# Patient Record
Sex: FEMALE | Race: WHITE | NOT HISPANIC OR LATINO | Employment: UNEMPLOYED | URBAN - NONMETROPOLITAN AREA
[De-identification: names, ages, dates, MRNs, and addresses within clinical notes are randomized per-mention and may not be internally consistent; named-entity substitution may affect disease eponyms.]

---

## 2018-08-28 ENCOUNTER — OFFICE VISIT (OUTPATIENT)
Dept: CARDIOLOGY | Facility: CLINIC | Age: 23
End: 2018-08-28

## 2018-08-28 VITALS
OXYGEN SATURATION: 99 % | SYSTOLIC BLOOD PRESSURE: 153 MMHG | HEIGHT: 63 IN | DIASTOLIC BLOOD PRESSURE: 43 MMHG | WEIGHT: 184 LBS | BODY MASS INDEX: 32.6 KG/M2

## 2018-08-28 DIAGNOSIS — I87.303 VENOUS HYPERTENSION OF BOTH LOWER EXTREMITIES: Primary | ICD-10-CM

## 2018-08-28 DIAGNOSIS — R60.0 EDEMA, LOWER EXTREMITY: ICD-10-CM

## 2018-08-28 PROCEDURE — 99204 OFFICE O/P NEW MOD 45 MIN: CPT | Performed by: INTERNAL MEDICINE

## 2018-08-28 RX ORDER — CETIRIZINE HYDROCHLORIDE 10 MG/1
TABLET ORAL
Refills: 3 | COMMUNITY
Start: 2018-08-21

## 2024-02-14 ENCOUNTER — OFFICE VISIT (OUTPATIENT)
Dept: SURGERY | Facility: CLINIC | Age: 29
End: 2024-02-14
Payer: MEDICAID

## 2024-02-14 ENCOUNTER — TELEPHONE (OUTPATIENT)
Dept: SURGERY | Facility: CLINIC | Age: 29
End: 2024-02-14

## 2024-02-14 VITALS
SYSTOLIC BLOOD PRESSURE: 123 MMHG | WEIGHT: 170.4 LBS | BODY MASS INDEX: 30.19 KG/M2 | DIASTOLIC BLOOD PRESSURE: 80 MMHG | HEART RATE: 68 BPM | HEIGHT: 63 IN

## 2024-02-14 DIAGNOSIS — K82.8 BILIARY DYSKINESIA: Primary | ICD-10-CM

## 2024-02-14 PROCEDURE — 1160F RVW MEDS BY RX/DR IN RCRD: CPT | Performed by: SURGERY

## 2024-02-14 PROCEDURE — 99204 OFFICE O/P NEW MOD 45 MIN: CPT | Performed by: SURGERY

## 2024-02-14 PROCEDURE — 1159F MED LIST DOCD IN RCRD: CPT | Performed by: SURGERY

## 2024-02-14 RX ORDER — SODIUM CHLORIDE 9 MG/ML
40 INJECTION, SOLUTION INTRAVENOUS AS NEEDED
OUTPATIENT
Start: 2024-02-14

## 2024-02-14 RX ORDER — METHYLPHENIDATE HYDROCHLORIDE 36 MG/1
36 TABLET ORAL EVERY MORNING
COMMUNITY

## 2024-02-14 RX ORDER — SODIUM CHLORIDE 0.9 % (FLUSH) 0.9 %
3 SYRINGE (ML) INJECTION EVERY 12 HOURS SCHEDULED
OUTPATIENT
Start: 2024-02-14

## 2024-02-14 RX ORDER — INDOCYANINE GREEN AND WATER 25 MG
2.5 KIT INJECTION ONCE
OUTPATIENT
Start: 2024-02-14 | End: 2024-02-14

## 2024-02-14 RX ORDER — SPIRONOLACTONE 25 MG/1
25 TABLET ORAL DAILY
COMMUNITY

## 2024-02-14 RX ORDER — SODIUM CHLORIDE 0.9 % (FLUSH) 0.9 %
10 SYRINGE (ML) INJECTION AS NEEDED
OUTPATIENT
Start: 2024-02-14

## 2024-02-14 NOTE — H&P (VIEW-ONLY)
"Date of Service: 2/14/2024    Subjective   Gurinder Garnett is a 28 y.o. female is being seen for consultation for biliary dyskinesia today at the request of Ivette Clemente MD    Chief complaint: Abdominal pain    Gurinder Garnett is a 28 y.o.  female, history of cerebral palsy, presents to my clinic with roughly 3 weeks of postprandial right upper quadrant abdominal pain that is episodic.  It tends to be worse with greasy or spicy foods.  There are some foods that she can eat and do not bother her.  She reports nausea without vomiting.  She has daily bowel movements    Past Medical History:   Diagnosis Date    CP (cerebral palsy)     Edema extremities        Past Surgical History:   Procedure Laterality Date    TONSILECTOMY, ADENOIDECTOMY, BILATERAL MYRINGOTOMY AND TUBES           Family History   Problem Relation Age of Onset    Skin cancer Mother     Hypertension Father     Diabetes Maternal Grandmother     Diabetes Maternal Grandfather     Thyroid cancer Paternal Grandmother          Social History     Socioeconomic History    Marital status: Single   Tobacco Use    Smoking status: Never     Passive exposure: Never    Smokeless tobacco: Never   Vaping Use    Vaping Use: Never used   Substance and Sexual Activity    Alcohol use: No    Drug use: No    Sexual activity: Defer                Review of Systems  14 pt Ros neg except for what is noted in HPI        Objective     Physical Exam:      02/14/24  1043   Weight: 77.3 kg (170 lb 6.4 oz)    Body mass index is 30.19 kg/m².  Constitution: /80   Pulse 68   Ht 160 cm (63\")   Wt 77.3 kg (170 lb 6.4 oz)   BMI 30.19 kg/m²  . No acute distress   Head: Normocephalic, atraumatic.   Eyes: Aligned without strabismus. Conjunctivae noninjected   Ears, Nose, Mouth: , no lesions appreciated   CV: Rhythm  and rate regular  Respiratory: Symmetric chest expansion. No respiratory distress.   Gastrointestinal:  Soft, ND, NT  Skin:  No cyanosis, clubbing or edema bilaterally  "   Lymphatics: No abnormal cervical or supraclavicular adenopathy  Neurologic: No gross deficits.  Alert and oriented x3  Psychiatric: Normal mood        Results:    Normal upper quadrant ultrasound    I personally reviewed the images from the patient's CT abdomen pelvis which is overall normal.    HIDA scan with 0% ejection fraction      Gurinder Garnett is a 28 y.o.  female with biliary dyskinesia    Risks and benefits of conservative versus operative management discussed with the patient and stepfather.  Risks and benefits of robotic assisted laparoscopic cholecystectomy discussed with the patient and she elects to proceed      BMI is >= 30 and <35. (Class 1 Obesity). The following options were offered after discussion;: information on healthy weight added to patient's after visit summary             Adan Norman MD  [facility]

## 2024-02-19 ENCOUNTER — TELEPHONE (OUTPATIENT)
Dept: SURGERY | Facility: CLINIC | Age: 29
End: 2024-02-19
Payer: MEDICAID

## 2024-02-19 NOTE — TELEPHONE ENCOUNTER
ATTEMPTED TO REACH PATIENT TO CONFIRM ARRIVAL TIME FOR SURGERY WITH DR. GLOVER 02/20/24 ARRIVAL AT 7:30 AM.  NO VOICE MAIL AVAILABLE.    PAT:  02/19/24 ARRIVE AT 8:45 AM, PATIENT HAS ARRIVED FOR THIS PROCESS.    NOTHING TO EAT OR DRINK AFTER MIDNIGHT ON MON 01/19/24  SX:  TUESDAY 02/20/24 ARRIVE AT 7:30 AM AT [facility], OUTPATIENT SURGERY DEPT.  PLEASE HAVE AN ADULT  WITH YOU TO DRIVE YOU HOME AFTER THE PROCEDURE.    IF YOU HAVE ANY QUESTIONS, PLEASE CALL OUR OFFICE [phone]    THANK YOU  OVI

## 2024-02-20 ENCOUNTER — ANESTHESIA (OUTPATIENT)
Dept: PERIOP | Facility: HOSPITAL | Age: 29
End: 2024-02-20
Payer: MEDICAID

## 2024-02-20 ENCOUNTER — HOSPITAL ENCOUNTER (OUTPATIENT)
Facility: HOSPITAL | Age: 29
Setting detail: HOSPITAL OUTPATIENT SURGERY
Discharge: HOME | End: 2024-02-20
Attending: SURGERY | Admitting: SURGERY
Payer: MEDICAID

## 2024-02-20 ENCOUNTER — APPOINTMENT (OUTPATIENT)
Dept: GENERAL RADIOLOGY | Facility: HOSPITAL | Age: 29
End: 2024-02-20
Payer: MEDICAID

## 2024-02-20 ENCOUNTER — ANESTHESIA EVENT (OUTPATIENT)
Dept: PERIOP | Facility: HOSPITAL | Age: 29
End: 2024-02-20
Payer: MEDICAID

## 2024-02-20 VITALS
HEIGHT: 62 IN | RESPIRATION RATE: 17 BRPM | TEMPERATURE: 98 F | OXYGEN SATURATION: 97 % | BODY MASS INDEX: 31.1 KG/M2 | HEART RATE: 66 BPM | SYSTOLIC BLOOD PRESSURE: 120 MMHG | WEIGHT: 169 LBS | DIASTOLIC BLOOD PRESSURE: 74 MMHG

## 2024-02-20 DIAGNOSIS — K82.8 BILIARY DYSKINESIA: Primary | ICD-10-CM

## 2024-02-20 LAB
B-HCG UR QL: NEGATIVE
EXPIRATION DATE: NORMAL
INTERNAL NEGATIVE CONTROL: NEGATIVE
INTERNAL POSITIVE CONTROL: POSITIVE
Lab: NORMAL

## 2024-02-20 PROCEDURE — 25010000002 KETOROLAC TROMETHAMINE PER 15 MG: Performed by: NURSE ANESTHETIST, CERTIFIED REGISTERED

## 2024-02-20 PROCEDURE — 25010000002 ROPIVACAINE PER 1 MG: Performed by: ANESTHESIOLOGY

## 2024-02-20 PROCEDURE — 25010000002 MEPERIDINE PER 100 MG: Performed by: NURSE ANESTHETIST, CERTIFIED REGISTERED

## 2024-02-20 PROCEDURE — 25810000003 SODIUM CHLORIDE PER 500 ML: Performed by: SURGERY

## 2024-02-20 PROCEDURE — 25010000002 BUPRENORPHINE PER 0.1 MG: Performed by: ANESTHESIOLOGY

## 2024-02-20 PROCEDURE — 25810000003 LACTATED RINGERS PER 1000 ML: Performed by: ANESTHESIOLOGY

## 2024-02-20 PROCEDURE — 81025 URINE PREGNANCY TEST: CPT | Performed by: ANESTHESIOLOGY

## 2024-02-20 PROCEDURE — 25010000002 FENTANYL CITRATE (PF) 50 MCG/ML SOLUTION: Performed by: NURSE ANESTHETIST, CERTIFIED REGISTERED

## 2024-02-20 PROCEDURE — 25010000002 INDOCYANINE GREEN 25 MG RECONSTITUTED SOLUTION: Performed by: SURGERY

## 2024-02-20 PROCEDURE — 25010000002 ONDANSETRON PER 1 MG: Performed by: NURSE ANESTHETIST, CERTIFIED REGISTERED

## 2024-02-20 PROCEDURE — 25010000002 DEXAMETHASONE PER 1 MG: Performed by: ANESTHESIOLOGY

## 2024-02-20 PROCEDURE — 25010000002 PROPOFOL 200 MG/20ML EMULSION: Performed by: NURSE ANESTHETIST, CERTIFIED REGISTERED

## 2024-02-20 PROCEDURE — 25010000002 NEOSTIGMINE 10 MG/10ML SOLUTION: Performed by: NURSE ANESTHETIST, CERTIFIED REGISTERED

## 2024-02-20 PROCEDURE — 25010000002 GLYCOPYRROLATE 0.4 MG/2ML SOLUTION: Performed by: NURSE ANESTHETIST, CERTIFIED REGISTERED

## 2024-02-20 PROCEDURE — 25010000002 CEFAZOLIN PER 500 MG: Performed by: SURGERY

## 2024-02-20 PROCEDURE — 25010000002 MIDAZOLAM PER 1 MG: Performed by: NURSE ANESTHETIST, CERTIFIED REGISTERED

## 2024-02-20 DEVICE — CLIP LIG HEMOLOK PA LG 6CT PRP: Type: IMPLANTABLE DEVICE | Site: ABDOMEN | Status: FUNCTIONAL

## 2024-02-20 DEVICE — SEAL HEMO SURG ARISTA/AH ABS/PWDR 3GM: Type: IMPLANTABLE DEVICE | Site: ABDOMEN | Status: FUNCTIONAL

## 2024-02-20 RX ORDER — SODIUM CHLORIDE 0.9 % (FLUSH) 0.9 %
3 SYRINGE (ML) INJECTION EVERY 12 HOURS SCHEDULED
Status: DISCONTINUED | OUTPATIENT
Start: 2024-02-20 | End: 2024-02-20 | Stop reason: HOSPADM

## 2024-02-20 RX ORDER — OXYCODONE HYDROCHLORIDE AND ACETAMINOPHEN 5; 325 MG/1; MG/1
1 TABLET ORAL ONCE AS NEEDED
Status: COMPLETED | OUTPATIENT
Start: 2024-02-20 | End: 2024-02-20

## 2024-02-20 RX ORDER — SODIUM CHLORIDE 0.9 % (FLUSH) 0.9 %
10 SYRINGE (ML) INJECTION AS NEEDED
Status: DISCONTINUED | OUTPATIENT
Start: 2024-02-20 | End: 2024-02-20 | Stop reason: HOSPADM

## 2024-02-20 RX ORDER — PROPOFOL 10 MG/ML
INJECTION, EMULSION INTRAVENOUS AS NEEDED
Status: DISCONTINUED | OUTPATIENT
Start: 2024-02-20 | End: 2024-02-20 | Stop reason: SURG

## 2024-02-20 RX ORDER — NEOSTIGMINE METHYLSULFATE 1 MG/ML
INJECTION, SOLUTION INTRAVENOUS AS NEEDED
Status: DISCONTINUED | OUTPATIENT
Start: 2024-02-20 | End: 2024-02-20 | Stop reason: SURG

## 2024-02-20 RX ORDER — ONDANSETRON 2 MG/ML
INJECTION INTRAMUSCULAR; INTRAVENOUS AS NEEDED
Status: DISCONTINUED | OUTPATIENT
Start: 2024-02-20 | End: 2024-02-20 | Stop reason: SURG

## 2024-02-20 RX ORDER — IBUPROFEN 600 MG/1
600 TABLET ORAL EVERY 6 HOURS PRN
Qty: 20 TABLET | Refills: 0 | Status: SHIPPED | OUTPATIENT
Start: 2024-02-20 | End: 2025-02-19

## 2024-02-20 RX ORDER — IPRATROPIUM BROMIDE AND ALBUTEROL SULFATE 2.5; .5 MG/3ML; MG/3ML
3 SOLUTION RESPIRATORY (INHALATION) ONCE AS NEEDED
Status: DISCONTINUED | OUTPATIENT
Start: 2024-02-20 | End: 2024-02-20 | Stop reason: HOSPADM

## 2024-02-20 RX ORDER — MEPERIDINE HYDROCHLORIDE 25 MG/ML
12.5 INJECTION INTRAMUSCULAR; INTRAVENOUS; SUBCUTANEOUS
Status: COMPLETED | OUTPATIENT
Start: 2024-02-20 | End: 2024-02-20

## 2024-02-20 RX ORDER — ROPIVACAINE HYDROCHLORIDE 5 MG/ML
INJECTION, SOLUTION EPIDURAL; INFILTRATION; PERINEURAL
Status: COMPLETED | OUTPATIENT
Start: 2024-02-20 | End: 2024-02-20

## 2024-02-20 RX ORDER — LIDOCAINE HYDROCHLORIDE 20 MG/ML
INJECTION, SOLUTION EPIDURAL; INFILTRATION; INTRACAUDAL; PERINEURAL AS NEEDED
Status: DISCONTINUED | OUTPATIENT
Start: 2024-02-20 | End: 2024-02-20 | Stop reason: SURG

## 2024-02-20 RX ORDER — SODIUM CHLORIDE 9 MG/ML
40 INJECTION, SOLUTION INTRAVENOUS AS NEEDED
Status: DISCONTINUED | OUTPATIENT
Start: 2024-02-20 | End: 2024-02-20 | Stop reason: HOSPADM

## 2024-02-20 RX ORDER — GLYCOPYRROLATE 0.2 MG/ML
INJECTION INTRAMUSCULAR; INTRAVENOUS AS NEEDED
Status: DISCONTINUED | OUTPATIENT
Start: 2024-02-20 | End: 2024-02-20 | Stop reason: SURG

## 2024-02-20 RX ORDER — ONDANSETRON 2 MG/ML
4 INJECTION INTRAMUSCULAR; INTRAVENOUS AS NEEDED
Status: DISCONTINUED | OUTPATIENT
Start: 2024-02-20 | End: 2024-02-20 | Stop reason: HOSPADM

## 2024-02-20 RX ORDER — MIDAZOLAM HYDROCHLORIDE 1 MG/ML
INJECTION INTRAMUSCULAR; INTRAVENOUS AS NEEDED
Status: DISCONTINUED | OUTPATIENT
Start: 2024-02-20 | End: 2024-02-20 | Stop reason: SURG

## 2024-02-20 RX ORDER — SODIUM CHLORIDE, SODIUM LACTATE, POTASSIUM CHLORIDE, CALCIUM CHLORIDE 600; 310; 30; 20 MG/100ML; MG/100ML; MG/100ML; MG/100ML
100 INJECTION, SOLUTION INTRAVENOUS ONCE AS NEEDED
Status: DISCONTINUED | OUTPATIENT
Start: 2024-02-20 | End: 2024-02-20 | Stop reason: HOSPADM

## 2024-02-20 RX ORDER — SODIUM CHLORIDE 9 MG/ML
INJECTION, SOLUTION INTRAVENOUS AS NEEDED
Status: DISCONTINUED | OUTPATIENT
Start: 2024-02-20 | End: 2024-02-20 | Stop reason: HOSPADM

## 2024-02-20 RX ORDER — INDOCYANINE GREEN AND WATER 25 MG
2.5 KIT INJECTION ONCE
Status: COMPLETED | OUTPATIENT
Start: 2024-02-20 | End: 2024-02-20

## 2024-02-20 RX ORDER — ROCURONIUM BROMIDE 10 MG/ML
INJECTION, SOLUTION INTRAVENOUS AS NEEDED
Status: DISCONTINUED | OUTPATIENT
Start: 2024-02-20 | End: 2024-02-20 | Stop reason: SURG

## 2024-02-20 RX ORDER — MIDAZOLAM HYDROCHLORIDE 1 MG/ML
1 INJECTION INTRAMUSCULAR; INTRAVENOUS
Status: DISCONTINUED | OUTPATIENT
Start: 2024-02-20 | End: 2024-02-20 | Stop reason: HOSPADM

## 2024-02-20 RX ORDER — SODIUM CHLORIDE 0.9 % (FLUSH) 0.9 %
10 SYRINGE (ML) INJECTION EVERY 12 HOURS SCHEDULED
Status: DISCONTINUED | OUTPATIENT
Start: 2024-02-20 | End: 2024-02-20 | Stop reason: HOSPADM

## 2024-02-20 RX ORDER — SODIUM CHLORIDE, SODIUM LACTATE, POTASSIUM CHLORIDE, CALCIUM CHLORIDE 600; 310; 30; 20 MG/100ML; MG/100ML; MG/100ML; MG/100ML
125 INJECTION, SOLUTION INTRAVENOUS ONCE
Status: COMPLETED | OUTPATIENT
Start: 2024-02-20 | End: 2024-02-20

## 2024-02-20 RX ORDER — DEXAMETHASONE SODIUM PHOSPHATE 4 MG/ML
INJECTION, SOLUTION INTRA-ARTICULAR; INTRALESIONAL; INTRAMUSCULAR; INTRAVENOUS; SOFT TISSUE
Status: COMPLETED | OUTPATIENT
Start: 2024-02-20 | End: 2024-02-20

## 2024-02-20 RX ORDER — MIDAZOLAM HYDROCHLORIDE 1 MG/ML
1 INJECTION INTRAMUSCULAR; INTRAVENOUS
Status: DISCONTINUED | OUTPATIENT
Start: 2024-02-20 | End: 2024-02-20 | Stop reason: SDUPTHER

## 2024-02-20 RX ORDER — KETOROLAC TROMETHAMINE 30 MG/ML
INJECTION, SOLUTION INTRAMUSCULAR; INTRAVENOUS AS NEEDED
Status: DISCONTINUED | OUTPATIENT
Start: 2024-02-20 | End: 2024-02-20 | Stop reason: SURG

## 2024-02-20 RX ORDER — FENTANYL CITRATE 50 UG/ML
50 INJECTION, SOLUTION INTRAMUSCULAR; INTRAVENOUS
Status: DISCONTINUED | OUTPATIENT
Start: 2024-02-20 | End: 2024-02-20 | Stop reason: HOSPADM

## 2024-02-20 RX ORDER — FAMOTIDINE 10 MG/ML
INJECTION, SOLUTION INTRAVENOUS AS NEEDED
Status: DISCONTINUED | OUTPATIENT
Start: 2024-02-20 | End: 2024-02-20 | Stop reason: SURG

## 2024-02-20 RX ORDER — TRAMADOL HYDROCHLORIDE 50 MG/1
50 TABLET ORAL EVERY 6 HOURS PRN
Qty: 12 TABLET | Refills: 0 | Status: SHIPPED | OUTPATIENT
Start: 2024-02-20 | End: 2025-02-19

## 2024-02-20 RX ORDER — FENTANYL CITRATE 50 UG/ML
INJECTION, SOLUTION INTRAMUSCULAR; INTRAVENOUS AS NEEDED
Status: DISCONTINUED | OUTPATIENT
Start: 2024-02-20 | End: 2024-02-20 | Stop reason: SURG

## 2024-02-20 RX ORDER — ACETAMINOPHEN 500 MG
1000 TABLET ORAL EVERY 6 HOURS
Qty: 40 TABLET | Refills: 0 | Status: SHIPPED | OUTPATIENT
Start: 2024-02-20 | End: 2024-02-25

## 2024-02-20 RX ORDER — BUPRENORPHINE HYDROCHLORIDE 0.32 MG/ML
INJECTION INTRAMUSCULAR; INTRAVENOUS
Status: COMPLETED | OUTPATIENT
Start: 2024-02-20 | End: 2024-02-20

## 2024-02-20 RX ADMIN — FENTANYL CITRATE 50 MCG: 50 INJECTION, SOLUTION INTRAMUSCULAR; INTRAVENOUS at 11:03

## 2024-02-20 RX ADMIN — SODIUM CHLORIDE, POTASSIUM CHLORIDE, SODIUM LACTATE AND CALCIUM CHLORIDE: 600; 310; 30; 20 INJECTION, SOLUTION INTRAVENOUS at 09:03

## 2024-02-20 RX ADMIN — FENTANYL CITRATE 100 MCG: 50 INJECTION INTRAMUSCULAR; INTRAVENOUS at 09:03

## 2024-02-20 RX ADMIN — KETOROLAC TROMETHAMINE 30 MG: 30 INJECTION, SOLUTION INTRAMUSCULAR; INTRAVENOUS at 09:13

## 2024-02-20 RX ADMIN — FAMOTIDINE 20 MG: 10 INJECTION, SOLUTION INTRAVENOUS at 09:03

## 2024-02-20 RX ADMIN — CEFAZOLIN 2 G: 2 INJECTION, POWDER, FOR SOLUTION INTRAMUSCULAR; INTRAVENOUS at 09:13

## 2024-02-20 RX ADMIN — ROCURONIUM BROMIDE 40 MG: 10 SOLUTION INTRAVENOUS at 09:10

## 2024-02-20 RX ADMIN — ONDANSETRON 4 MG: 2 INJECTION INTRAMUSCULAR; INTRAVENOUS at 09:03

## 2024-02-20 RX ADMIN — ROPIVACAINE HYDROCHLORIDE 220 MG: 5 INJECTION, SOLUTION EPIDURAL; INFILTRATION; PERINEURAL at 09:15

## 2024-02-20 RX ADMIN — FENTANYL CITRATE 50 MCG: 50 INJECTION, SOLUTION INTRAMUSCULAR; INTRAVENOUS at 10:37

## 2024-02-20 RX ADMIN — LIDOCAINE HYDROCHLORIDE 100 MG: 20 INJECTION, SOLUTION EPIDURAL; INFILTRATION; INTRACAUDAL; PERINEURAL at 09:10

## 2024-02-20 RX ADMIN — INDOCYANINE GREEN AND WATER 2.5 MG: KIT at 09:00

## 2024-02-20 RX ADMIN — NEOSTIGMINE METHYLSULFATE 3 MG: 0.5 INJECTION INTRAVENOUS at 10:00

## 2024-02-20 RX ADMIN — DEXAMETHASONE SODIUM PHOSPHATE 8 MG: 4 INJECTION, SOLUTION INTRA-ARTICULAR; INTRALESIONAL; INTRAMUSCULAR; INTRAVENOUS; SOFT TISSUE at 09:15

## 2024-02-20 RX ADMIN — SODIUM CHLORIDE, POTASSIUM CHLORIDE, SODIUM LACTATE AND CALCIUM CHLORIDE 125 ML/HR: 600; 310; 30; 20 INJECTION, SOLUTION INTRAVENOUS at 08:58

## 2024-02-20 RX ADMIN — PROPOFOL 200 MG: 10 INJECTION, EMULSION INTRAVENOUS at 09:10

## 2024-02-20 RX ADMIN — MEPERIDINE HYDROCHLORIDE 12.5 MG: 25 INJECTION INTRAMUSCULAR; INTRAVENOUS; SUBCUTANEOUS at 11:02

## 2024-02-20 RX ADMIN — MEPERIDINE HYDROCHLORIDE 12.5 MG: 25 INJECTION INTRAMUSCULAR; INTRAVENOUS; SUBCUTANEOUS at 11:07

## 2024-02-20 RX ADMIN — MIDAZOLAM HYDROCHLORIDE 2 MG: 1 INJECTION, SOLUTION INTRAMUSCULAR; INTRAVENOUS at 09:03

## 2024-02-20 RX ADMIN — BUPRENORPHINE HYDROCHLORIDE 0.3 MG: 0.32 INJECTION INTRAMUSCULAR; INTRAVENOUS at 09:15

## 2024-02-20 RX ADMIN — OXYCODONE HYDROCHLORIDE AND ACETAMINOPHEN 1 TABLET: 5; 325 TABLET ORAL at 11:02

## 2024-02-20 RX ADMIN — GLYCOPYRROLATE 0.4 MG: 0.4 INJECTION INTRAMUSCULAR; INTRAVENOUS at 10:00

## 2024-02-20 NOTE — ANESTHESIA PREPROCEDURE EVALUATION
Anesthesia Evaluation     no history of anesthetic complications:   NPO Solid Status: > 8 hours  NPO Liquid Status: > 8 hours           Airway   Mallampati: II  TM distance: >3 FB  Neck ROM: full  No difficulty expected  Dental - normal exam     Pulmonary - normal exam   (+) ,sleep apnea  Cardiovascular - normal exam        Neuro/Psych  GI/Hepatic/Renal/Endo      Musculoskeletal     Abdominal  - normal exam   Substance History      OB/GYN          Other        ROS/Med Hx Other: Cerebral Palsy                Anesthesia Plan    ASA 2     general with block     intravenous induction     Anesthetic plan, risks, benefits, and alternatives have been provided, discussed and informed consent has been obtained with: patient.      CODE STATUS:

## 2024-02-20 NOTE — ANESTHESIA POSTPROCEDURE EVALUATION
Patient: Gurinder Garnett    Procedure Summary       Date: 02/20/24 Room / Location:  [facility]  /  [facility]    Anesthesia Start: 0903 Anesthesia Stop: 1016    Procedure: CHOLECYSTECTOMY LAPAROSCOPIC WITH DAVINCI ROBOT (Abdomen) Diagnosis:       Biliary dyskinesia      (Biliary dyskinesia [K82.8])    Surgeons: Adan Norman MD Provider: Jun Schaeffer MD    Anesthesia Type: general with block ASA Status: 2            Anesthesia Type: general with block    Vitals  Vitals Value Taken Time   /76 02/20/24 1023   Temp 97.5 °F (36.4 °C) 02/20/24 1018   Pulse 60 02/20/24 1024   Resp 15 02/20/24 1023   SpO2 96 % 02/20/24 1024   Vitals shown include unfiled device data.        Post Anesthesia Care and Evaluation    Patient location during evaluation: PHASE II  Patient participation: complete - patient participated  Level of consciousness: awake and alert  Pain score: 0  Pain management: adequate    Airway patency: patent  Anesthetic complications: No anesthetic complications    Cardiovascular status: acceptable  Respiratory status: acceptable  Hydration status: acceptable

## 2024-02-20 NOTE — ANESTHESIA PROCEDURE NOTES
"Peripheral Block      Patient reassessed immediately prior to procedure    Patient location during procedure: OR  Start time: 2/20/2024 9:14 AM  Stop time: 2/20/2024 9:15 AM  Reason for block: at surgeon's request and post-op pain management  Performed by  CRNA/CAA: Fina Sellers CRNA  Preanesthetic Checklist  Completed: patient identified, IV checked, site marked, risks and benefits discussed, surgical consent, monitors and equipment checked, pre-op evaluation and timeout performed  Prep:  Pt Position: supine  Sterile barriers:cap, gloves, sterile barriers and mask  Prep: ChloraPrep  Patient monitoring: blood pressure monitoring, continuous pulse oximetry and EKG  Procedure    Nursing cardiac assessment comments yes: Sedation, GA, Spinal,Epidural   Performed under: general  Guidance:ultrasound guided    ULTRASOUND INTERPRETATION.  Using ultrasound guidance a 20 G (20g 4\" Stimuplex) gauge needle was placed in close proximity to the nerve, at which point, under ultrasound guidance anesthetic was injected in the area of the nerve and spread of the anesthesia was seen on ultrasound in close proximity thereto.  There were no abnormalities seen on ultrasound; a digital image was taken; and the patient tolerated the procedure with no complications. Images:still images obtained    Laterality:Bilateral  Block Type:TAP  Injection Technique:single-shot  Needle Type:short-bevel  Needle Gauge:20 G  Resistance on Injection: none    Medications Used: buprenorphine (BUPRENEX) injection - Peripheral Nerve, Transversus Abdominus Plane   0.3 mg - 2/20/2024 9:15:00 AM  dexamethasone (DECADRON) injection - Peripheral Nerve, Transversus Abdominus Plane   8 mg - 2/20/2024 9:15:00 AM  ropivacaine (NAROPIN) injection 0.5 % - Peripheral Nerve, Transversus Abdominus Plane   220 mg - 2/20/2024 9:15:00 AM      Medications  Comment:Block Injection:  Total volume divided equally between all 4 injection sites      Post Assessment  Injection " Assessment: negative aspiration for heme, incremental injection and no paresthesia on injection  Patient Tolerance:comfortable throughout block  Complications:no  Additional Notes  The pt was in the supine position under general anesthesia    Under Ultrasound guidance, a BBraun 4inch 360 degree needle was advanced with Normal Saline hydro dissection of tissue.  The Internal Oblique and Transversus Abdominus muscles where visualized.  At or before the aponeurosis of Internal Oblique, local anesthetic spread was visualized in the Transversus Abdominus Plane. Injection was made incrementally with aspiration every 5 mls.  There was no  intravascular injection,  injection pressure was normal, there was no neural injection, and the procedure was completed without difficulty. The same procedure was completed for left and right sided lateral tap blocks.    Under Ultrasound guidance, a Zuleta 4inch 360 degree needle was advanced with Normal Saline hydro dissection of tissue.  The Rectus and Transversus Abdominus muscles where visualized.  The needle tip was placed between the Transversus Abdominus and rectus abdominus, local anesthetic spread was visualized in the Transversus Abdominus Plane. Injection was made incrementally with aspiration every 5 mls.  There was no  intravascular injection,  injection pressure was normal, there was no neural injection, and the procedure was completed without difficulty. The same procedure was completed for left and right sided subcostal tap blocks. Thank You.

## 2024-02-20 NOTE — ANESTHESIA PROCEDURE NOTES
Airway  Urgency: elective    Date/Time: 2/20/2024 9:11 AM  Airway not difficult    General Information and Staff    Patient location during procedure: OR  CRNA/CAA: Fina Sellers CRNA    Indications and Patient Condition  Indications for airway management: airway protection    Preoxygenated: yes  MILS maintained throughout  Mask difficulty assessment: 1 - vent by mask    Final Airway Details  Final airway type: endotracheal airway      Successful airway: ETT  Cuffed: yes   Successful intubation technique: direct laryngoscopy  Facilitating devices/methods: intubating stylet  Endotracheal tube insertion site: oral  Blade: Payal  Blade size: 3  ETT size (mm): 7.0  Cormack-Lehane Classification: grade I - full view of glottis  Placement verified by: chest auscultation and capnometry   Measured from: lips  ETT/EBT  to lips (cm): 19  Number of attempts at approach: 1  Assessment: lips, teeth, and gum same as pre-op and atraumatic intubation

## 2024-02-20 NOTE — OP NOTE
"Operative Report    Patient Name:  Gurinder Garnett  YOB: 1995  [id number]  2/20/2024      PREOPERATIVE DIAGNOSIS: Biliary dyskinesia      POSTOPERATIVE DIAGNOSIS: Same        PROCEDURE PERFORMED:     Robotic assisted laparoscopic cholecystectomy        SURGEON: Adan Norman MD         SPECIMENS: Gallbladder and contents        ANESTHESIA: General. TAP block        FINDINGS:     1. Gallbladder in standard positioning  .            INDICATIONS:      The patient is a 28 y.o. female with a history of episodic right upper quadrant abdominal pain.  HIDA scan with reduced ejection fraction. The risks and benefits of robotic assisted laparoscopic cholecystectomy with possible cholangiography were discussed with the patient and their family and they agree to proceed.        DESCRIPTION OF PROCEDURE:    After obtaining informed consent, the patient was taken to the operating room and placed in the supine position. After appropriate DVT and antibiotic prophylaxis, general anesthesia was induced. The abdomen was prepped and draped in standard sterile fashion.  A proper timeout was performed  Abdomen was entered in the left hemiabdomen utilizing an 8 mm robotic trocar gasless.  The abdomen was insufflated to 15 mmHg.              Additional 8 mm robotic trocars were placed in the right paramedian and right lateral hemiabdomen, just above the level of the umbilicus.  An 8 mm assistant trocar was placed in the right upper quadrant.  All under direct laparoscopic visualization.  Robot was docked.                 The gallbladder was grasped and elevated cephalad.  Medial and lateral peritoneotomy was created with cautery.  Using meticulous electrocautery dissection , the cystic duct and cystic artery were bluntly dissected free of other structures and clearly identified using the \"Critical View\" technique with only two structures entering the gallbladder, hepatocystic triangle cleared of fat and fibrous tissue, " lower third of posterior gallbladder wall dissected off of the cystic plate.  The cystic artery was then clipped once proximally and divided with cautery.              The cystic duct was then clipped twice distally and once at the junction of the duct and infundibulum, followed by transection with cautery.                            The gallbladder was then dissected free of the gallbladder fossa using a combination of electrocautery and blunt dissection.  Modesta was placed within the gallbladder fossa to minimize risk of perioperative fluid collection development.  The gallbladder was then placed in an Endo Catch bag, and removed from the left abdominal trocar.  The fascia of this trocar site was then closed with a figure-of-eight 0 Vicryl suture utilizing a Cesar Christal device.                 The right upper quadrant was then inspected. The cystic duct and cystic artery stumps were intact without bleeding or biliary leak.  Remaining trocars were removed. The wounds were closed in using absorbable subcuticular suture.  Skin Affix placed on the incisions. The patient recovered from anesthesia, was extubated in the operating room, and transferred to the PACU in stable condition.  All sponge and needle counts were correct times two at the completion of the procedure. There were no immediate complications.      Estimated blood loss: Negligible    Adan Norman MD  2/20/2024  10:03 EST

## 2024-02-22 LAB — REF LAB TEST METHOD: NORMAL

## 2024-03-06 ENCOUNTER — OFFICE VISIT (OUTPATIENT)
Dept: SURGERY | Facility: CLINIC | Age: 29
End: 2024-03-06
Payer: MEDICAID

## 2024-03-06 VITALS — BODY MASS INDEX: 31.47 KG/M2 | HEIGHT: 62 IN | WEIGHT: 171 LBS

## 2024-03-06 DIAGNOSIS — K82.8 BILIARY DYSKINESIA: Primary | ICD-10-CM

## 2024-03-06 PROCEDURE — 1159F MED LIST DOCD IN RCRD: CPT | Performed by: SURGERY

## 2024-03-06 PROCEDURE — 99024 POSTOP FOLLOW-UP VISIT: CPT | Performed by: SURGERY

## 2024-03-06 PROCEDURE — 1160F RVW MEDS BY RX/DR IN RCRD: CPT | Performed by: SURGERY

## 2024-03-06 NOTE — PROGRESS NOTES
Patient is following up after robotic assisted laparoscopic cholecystectomy on 2/20 for biliary dyskinesia.  She has recovered well and has no issues eating.  He has some mild soreness at site of specimen extraction, which we discussed is relatively normal.  She reports mild constipation and we discussed adding a small amount of MiraLAX    Incisions are clean, dry and intact without erythema or drainage    Surgical pathology demonstrated benign gallbladder with focal chronic inflammation    Follow-up as needed

## (undated) DEVICE — Device

## (undated) DEVICE — OBT BLADLES ENDOWRIST DAVINCI/X/XI 8MM DISP

## (undated) DEVICE — SEAL CANN ENDOWRIST DAVINCI/X/XI 5TO8MM

## (undated) DEVICE — SUT VIC 0/0 UR6 27IN DYED J603H

## (undated) DEVICE — HK CAUTRY ENDOWRIST PERM MONO DAVINCI/X/XI 8MM REUS

## (undated) DEVICE — PAD XL W/HD REST DERMAPROX ARM PROTECT PNK

## (undated) DEVICE — GLV SURG PREMIERPRO MIC LTX PF SZ8 BRN

## (undated) DEVICE — DRP SURG UTIL W/TP 15X26IN

## (undated) DEVICE — DRP ARM DAVINCI/X/XI DISP BX20

## (undated) DEVICE — GLV SURG PREMIERPRO MIC LTX PF SZ7.5 BRN

## (undated) DEVICE — UNDERGLV SURG BIOGEL INDICAT PF 8 GRN

## (undated) DEVICE — CVR SURG STND MAYO 24X53IN STRL

## (undated) DEVICE — GRSPR ENDOWRIST FRCP CADIERE DAVINCI/X/XI 8MM 18/REUS

## (undated) DEVICE — SUT MNCRYL 4/0 PS2 18 IN

## (undated) DEVICE — PAD POSTN ARMBND 1P/U

## (undated) DEVICE — SXN/IRR STRYKEFLOW2 W/TIPS 5 32CM

## (undated) DEVICE — CVR DISP HUG U VAC STEEP TREND

## (undated) DEVICE — PAD GRND REM POLYHESIVE A/ DISP

## (undated) DEVICE — PK LAP GEN 70